# Patient Record
Sex: FEMALE
[De-identification: names, ages, dates, MRNs, and addresses within clinical notes are randomized per-mention and may not be internally consistent; named-entity substitution may affect disease eponyms.]

---

## 2021-02-03 ENCOUNTER — TRANSCRIPTION ENCOUNTER (OUTPATIENT)
Age: 51
End: 2021-02-03

## 2021-03-03 ENCOUNTER — TRANSCRIPTION ENCOUNTER (OUTPATIENT)
Age: 51
End: 2021-03-03

## 2021-04-01 ENCOUNTER — TRANSCRIPTION ENCOUNTER (OUTPATIENT)
Age: 51
End: 2021-04-01

## 2021-04-08 ENCOUNTER — TRANSCRIPTION ENCOUNTER (OUTPATIENT)
Age: 51
End: 2021-04-08

## 2021-05-06 ENCOUNTER — TRANSCRIPTION ENCOUNTER (OUTPATIENT)
Age: 51
End: 2021-05-06

## 2021-05-24 ENCOUNTER — TRANSCRIPTION ENCOUNTER (OUTPATIENT)
Age: 51
End: 2021-05-24

## 2021-06-08 ENCOUNTER — TRANSCRIPTION ENCOUNTER (OUTPATIENT)
Age: 51
End: 2021-06-08

## 2021-07-09 ENCOUNTER — TRANSCRIPTION ENCOUNTER (OUTPATIENT)
Age: 51
End: 2021-07-09

## 2021-07-31 ENCOUNTER — TRANSCRIPTION ENCOUNTER (OUTPATIENT)
Age: 51
End: 2021-07-31

## 2021-08-06 ENCOUNTER — TRANSCRIPTION ENCOUNTER (OUTPATIENT)
Age: 51
End: 2021-08-06

## 2022-02-26 ENCOUNTER — TRANSCRIPTION ENCOUNTER (OUTPATIENT)
Age: 52
End: 2022-02-26

## 2022-03-11 ENCOUNTER — TRANSCRIPTION ENCOUNTER (OUTPATIENT)
Age: 52
End: 2022-03-11

## 2022-07-08 ENCOUNTER — NON-APPOINTMENT (OUTPATIENT)
Age: 52
End: 2022-07-08

## 2022-07-14 ENCOUNTER — NON-APPOINTMENT (OUTPATIENT)
Age: 52
End: 2022-07-14

## 2023-02-01 ENCOUNTER — NON-APPOINTMENT (OUTPATIENT)
Age: 53
End: 2023-02-01

## 2023-06-19 PROBLEM — Z00.00 ENCOUNTER FOR PREVENTIVE HEALTH EXAMINATION: Status: ACTIVE | Noted: 2023-06-19

## 2023-08-07 ENCOUNTER — APPOINTMENT (OUTPATIENT)
Dept: ORTHOPEDIC SURGERY | Facility: CLINIC | Age: 53
End: 2023-08-07
Payer: COMMERCIAL

## 2023-08-07 VITALS
HEART RATE: 80 BPM | HEIGHT: 64 IN | WEIGHT: 160 LBS | BODY MASS INDEX: 27.31 KG/M2 | SYSTOLIC BLOOD PRESSURE: 96 MMHG | DIASTOLIC BLOOD PRESSURE: 64 MMHG

## 2023-08-07 DIAGNOSIS — Q68.6 DISCOID MENISCUS: ICD-10-CM

## 2023-08-07 PROCEDURE — 99204 OFFICE O/P NEW MOD 45 MIN: CPT

## 2023-08-08 PROBLEM — Q68.6 DISCOID LATERAL MENISCUS OF RIGHT KNEE: Status: ACTIVE | Noted: 2023-08-08

## 2023-08-08 NOTE — HISTORY OF PRESENT ILLNESS
[de-identified] : 53 year old female presents today with right knee pain. Patient had right knee arthroscopy and partial meniscectomy in December, 14 2022 by Dr. Zaman for locking of the knee. She completed PT after sx but continued to have locking with squatting and pivoting. She is very careful walking because of the locking. She was seen back by Dr. Zaman, referred for PT which was not helpful. She followed up with another orthopedist which obtained new MRI who recommended another sx. She is here for third opinion. She takes Advil as needed.   The patient's past medical history, past surgical history, medications and allergies were reviewed by me today with the patient and documented accordingly. In addition, the patient's family and social history, which were noncontributory to this visit were reviewed also.

## 2023-08-08 NOTE — PHYSICAL EXAM
[de-identified] : Oriented to time, place, person Mood: Normal Affect: Normal Appearance: Healthy, well appearing, no acute distress. Gait: Normal Assistive Devices: None  Right knee exam  Skin: Clean, dry, intact Inspection: No obvious malalignment, no masses, no swelling, no effusion Pulses: 2+ DP/PT pulses ROM: 0-130 degrees of flexion. + Lateral pain with deep knee flexion/extension. Tenderness: No MJLT. +LJLT. No pain over the patella facets. No pain to the quadriceps tendon. No pain to the patella tendon. No posterior knee tenderness. Stability: Stable to varus, valgus. Negative lachman testing. Negative anterior drawer, negative posterior drawer. Strength: 5/5 Q/H/TA/GS/EHL, without atrophy Neuro: In tact to light touch throughout, DTR's normal Additional tests: +McMurrays test, Negative patellar grind test. 		 [de-identified] : Images were reviewed dated 6/10/2023   4 views of the right knee that show no acute fracture or dislocation. There is no medial, no lateral and mild patellofemoral degenerative changes seen. There is no significant malalignment. No significant other obvious osseous abnormality, otherwise unremarkable.  MRI right knee dated 6/30/2023 shows patella-femoral chondromalacia. Evidence of lateral discoid meniscus.

## 2023-08-08 NOTE — DISCUSSION/SUMMARY
[de-identified] : 54 y/o female with right knee pain.   Patient presents with right knee pain following surgical arthroscopy appears as though the patient underwent surgical partial meniscectomy in 2022, but on repeat MRI shows continued discoid meniscus within the lateral compartment of the knee.  It appears as though a saucerization was not performed at that time.  Patient also has some mild patellofemoral chondromalacia, but majority of her symptoms do appear to be related to her lateral compartment discoid meniscus.  Possible instability through the periphery.  Would recommend review of the patient's operative report for consideration of additional intervention that may include repeat arthroscopy and saucerization of the lateral meniscus.  Recommendations: Conservative care & observation. Patient may also use OTC NSAID's or acetaminophen as tolerated, with application of ice to the area 2-3x daily for 20 minutes after periods of activity.  Follow-up with operative report right knee, may consider additional debridement lateral meniscus with or without peripheral fixation if unstable.  Patient voiced understanding.